# Patient Record
Sex: FEMALE | Race: AMERICAN INDIAN OR ALASKA NATIVE
[De-identification: names, ages, dates, MRNs, and addresses within clinical notes are randomized per-mention and may not be internally consistent; named-entity substitution may affect disease eponyms.]

---

## 2018-01-06 ENCOUNTER — HOSPITAL ENCOUNTER (EMERGENCY)
Dept: HOSPITAL 5 - ED | Age: 58
LOS: 1 days | Discharge: HOME | End: 2018-01-07
Payer: COMMERCIAL

## 2018-01-06 DIAGNOSIS — F41.9: ICD-10-CM

## 2018-01-06 DIAGNOSIS — Z88.8: ICD-10-CM

## 2018-01-06 DIAGNOSIS — K21.9: ICD-10-CM

## 2018-01-06 DIAGNOSIS — S60.413A: ICD-10-CM

## 2018-01-06 DIAGNOSIS — I10: ICD-10-CM

## 2018-01-06 DIAGNOSIS — E78.00: ICD-10-CM

## 2018-01-06 DIAGNOSIS — S80.01XA: Primary | ICD-10-CM

## 2018-01-06 DIAGNOSIS — M54.5: ICD-10-CM

## 2018-01-06 PROCEDURE — 72100 X-RAY EXAM L-S SPINE 2/3 VWS: CPT

## 2018-01-06 PROCEDURE — 90715 TDAP VACCINE 7 YRS/> IM: CPT

## 2018-01-06 PROCEDURE — 90471 IMMUNIZATION ADMIN: CPT

## 2018-01-06 NOTE — EMERGENCY DEPARTMENT REPORT
ED Fall HPI





- General


Chief Complaint: Fall


Stated Complaint: FALL


Time Seen by Provider: 01/06/18 23:49


Source: patient


Mode of arrival: Ambulatory





- History of Present Illness


Initial Comments: 


57-year-old female past medical history hypertension, GERD presents with 

complaint of right knee pain left middle finger abrasion and lower back pain 

status post mechanical fall.  Patient states she was shopping at [employer] 

store today and accidentally tripped over a basket that was on ground.  He fell 

forward grazed the back of her left middle finger against a metal shelf.  

States that she fell onto her right knee and then rolled onto her lower back.  

Patient states pain is currently 6 out of 10.  Denies any head or neck trauma 

denies any loss of consciousness.  This was witnessed by bystanders.  He was 

assisted by bystanders to stand up.  Adamantly denies any direct head or neck 

trauma or loss of consciousness.  Patient is awake alert and oriented 3 denies 

chest pain abdominal pain up or lower extremity paresthesias neck pain is fully 

lucid and able to provide detailed history.  Patient is ambulatory without 

assistance.  Last tetanus vaccine was over 10 years ago.  Patient states she 

called her daughter after the fall and her daughter brought her to the hospital 

for evaluation.


MD Complaint: fall


-: This afternoon


Fall From: standing


Fall Witnessed: yes, by bystander


Place Fall Occurred: other (store)


Loss of Consciousness: none


Prolonged Down Time?: no


Symptoms Prior to Fall: none


Location - Extremities: Right: Knee


Severity scale (0 -10): 7


Quality: aching


Context: tripped/slipped


Associated Symptoms: denies





- Related Data


 Home Medications











 Medication  Instructions  Recorded  Confirmed  Last Taken


 


Lisinopril [Zestril] 10 mg PO QDAY 10/18/13 06/20/16 06/19/16 23:00


 


Metoprolol [Lopressor] 25 mg PO BID 10/18/13 06/20/16 06/19/16 21:00


 


Pravastatin [Pravachol] 40 mg PO QHS 10/18/13 06/20/16 06/19/16 21:00


 


Esomeprazole Magnesium [NexIUM] 40 mg PO DAILY 06/12/16 06/20/16 06/17/16 08:00








 Previous Rx's











 Medication  Instructions  Recorded  Last Taken  Type


 


Acetaminophen [Acetaminophen TAB] 500 mg PO Q6HR PRN #30 tablet 01/07/18 

Unknown Rx


 


Acetaminophen/Codeine [Tylenol 1 tab PO Q6H PRN #6 tab 01/07/18 Unknown Rx





/Codeine # 3 tab]    


 


Neomycn/Bacitrc/Polymyx/Pramox 28 gm TP BID #1 oint...g. 01/07/18 Unknown Rx





[Triple Antibioti-Pain Rlf Oint]    











 Allergies











Allergy/AdvReac Type Severity Reaction Status Date / Time


 


iv contrast Allergy  Rash Uncoded 06/12/16 13:48














ED Review of Systems


ROS: 


Stated complaint: FALL


Other details as noted in HPI





Constitutional: denies: chills, fever


Eyes: denies: eye pain, eye discharge, vision change


ENT: denies: ear pain, throat pain


Respiratory: denies: cough, shortness of breath, wheezing


Cardiovascular: denies: chest pain, palpitations


Endocrine: no symptoms reported


Gastrointestinal: denies: abdominal pain, nausea, diarrhea


Genitourinary: denies: urgency, dysuria, discharge


Musculoskeletal: denies: back pain, joint swelling, arthralgia


Skin: denies: rash, lesions


Neurological: denies: headache, weakness, paresthesias


Psychiatric: denies: anxiety, depression


Hematological/Lymphatic: denies: easy bleeding, easy bruising





ED Past Medical Hx





- Past Medical History


Previous Medical History?: Yes


Hx Hypertension: Yes


Hx GERD: Yes (TAKES NEXIUM)


Hx Psychiatric Treatment: Yes (anxiety)


Additional medical history: high cholesterol





- Surgical History


Past Surgical History?: Yes


Additional Surgical History: hyst, tubal ligation





- Social History


Smoking Status: Never Smoker


Substance Use Type: Alcohol, Prescribed





- Medications


Home Medications: 


 Home Medications











 Medication  Instructions  Recorded  Confirmed  Last Taken  Type


 


Lisinopril [Zestril] 10 mg PO QDAY 10/18/13 06/20/16 06/19/16 23:00 History


 


Metoprolol [Lopressor] 25 mg PO BID 10/18/13 06/20/16 06/19/16 21:00 History


 


Pravastatin [Pravachol] 40 mg PO QHS 10/18/13 06/20/16 06/19/16 21:00 History


 


Esomeprazole Magnesium [NexIUM] 40 mg PO DAILY 06/12/16 06/20/16 06/17/16 08:00 

History


 


Acetaminophen [Acetaminophen TAB] 500 mg PO Q6HR PRN #30 tablet 01/07/18  

Unknown Rx


 


Acetaminophen/Codeine [Tylenol 1 tab PO Q6H PRN #6 tab 01/07/18  Unknown Rx





/Codeine # 3 tab]     


 


Neomycn/Bacitrc/Polymyx/Pramox 28 gm TP BID #1 oint...g. 01/07/18  Unknown Rx





[Triple Antibioti-Pain Rlf Oint]     














ED Physical Exam





- General


Limitations: No Limitations


General appearance: alert, in no apparent distress





- Head


Head exam: Present: atraumatic, normocephalic





- Eye


Eye exam: Present: normal appearance, PERRL, EOMI





- ENT


ENT exam: Present: mucous membranes moist





- Neck


Neck exam: Present: normal inspection, full ROM (neck flexion and extension 

fully intact)





- Respiratory


Respiratory exam: Present: normal lung sounds bilaterally.  Absent: respiratory 

distress





- Cardiovascular


Cardiovascular Exam: Present: regular rate, normal rhythm.  Absent: systolic 

murmur, diastolic murmur, rubs, gallop





- GI/Abdominal


GI/Abdominal exam: Present: soft (abdomen soft nontender nondistended 4 

quadrants), normal bowel sounds





- Extremities Exam


Extremities exam: Present: normal inspection





- Expanded Upper Extremity Exam


  ** Left


Upper Arm exam: Present: normal inspection, full ROM


Elbow exam: Present: normal inspection, full ROM


Forearm Wrist exam: Present: normal inspection, full ROM


Hand Wrist exam: Present: normal inspection, full ROM (range of motion DIPs 

MCPs and PIPs left hand fully intact)


Neuro motor exam: Present: wrist extension intact, thumb opposition intact, 

thumb IP flexion intact, thumb adduction intact, fingers 2-5 abduction intact


Neurosensory exam: Present: radial nerve intact, ulnar nerve intact, median 

nerve intact


Vascular: Present: normal capillary refill (capillary refill less than one 

second in all fingers left hand), radial pulse (distal radial brachial and 

ulnar pulses intact on palpation), brachial pulse, ulnar pulse





- Expanded Lower Extremity Exam


  ** Right


Upper Leg exam: Present: normal inspection, full ROM


Knee exam: Present: normal inspection, full ROM (right knee flexion and 

extension fully intact on exam)


Lower Leg exam: Present: normal inspection, full ROM


Ankle exam: Present: normal inspection, full ROM


Foot/Toe exam: Present: normal inspection, full ROM


Neuro vascular tendon exam: Present: no vascular compromise (distal capillary 

refill right foot intact, dorsalis pedis and posterior tibial pulses intact)





- Back Exam


Back exam: Present: normal inspection





- Neurological Exam


Neurological exam: Present: alert, oriented X3, CN II-XII intact





- Expanded Neurological Exam


  ** Expanded


Patient oriented to: Present: person, place, time


Cranial nerves: EOM's Intact: Normal, Facial Sensation: Normal


Cerebellar function: Finger to Nose: Normal, Heel to Shin: Normal, Romberg: 

Normal


Sensory exam: Upper Extremity Light Touch: Abnormal Right, Lower Extremity 

Light Touch: Abnormal Right


Motor strength exam: RUE: 5, LUE: 5, RLE: 5, LLE: 5


Best Eye Response ([place]): (4) open spontaneously


Best Motor Response ([place]): (6) obeys commands


Best Verbal Response ([place]): (5) oriented


[place] Total: 15





- Psychiatric


Psychiatric exam: Present: normal affect, normal mood





- Skin


Skin exam: Present: warm, dry, intact, normal color.  Absent: rash





ED Course


 Vital Signs











  01/06/18 01/07/18 01/07/18





  17:58 00:03 00:11


 


Temperature 98.1 F  


 


Pulse Rate 62  56 L


 


Respiratory 18  18





Rate   


 


Blood Pressure 141/69 140/76 


 


O2 Sat by Pulse 100  100





Oximetry   














ED Medical Decision Making





- Medical Decision Making


A/P: Musculoskeletal pain, right knee contusion, left finger abrasion


1-triple antibiotic ointment, abrasion is superficial no need for sutures, 

tetanus update today


2-short course Tylenol 3 when necessary.  Ace wrap right knee, RICE therapy, 

patient is ambulatory


3-x-rays show no acute fractures, some degenerative changes and L-spine chronic


4-all primary care doctor 


Critical care attestation.: 


If time is entered above; I have spent that time in minutes in the direct care 

of this critically ill patient, excluding procedure time.








ED Disposition


Clinical Impression: 


Abrasion of finger of left hand


Qualifiers:


 Encounter type: initial encounter Qualified Code(s): S60.419A - Abrasion of 

unspecified finger, initial encounter





Contusion of right knee


Qualifiers:


 Encounter type: initial encounter Qualified Code(s): S80.01XA - Contusion of 

right knee, initial encounter





Lower back pain


Qualifiers:


 Chronicity: acute Back pain laterality: bilateral Sciatica presence: without 

sciatica Qualified Code(s): M54.5 - Low back pain





Fall on same level from slipping, tripping or stumbling


Qualifiers:


 Encounter type: initial encounter Qualified Code(s): W01.0XXA - Fall on same 

level from slipping, tripping and stumbling without subsequent striking against 

object, initial encounter





Disposition: DC-01 TO HOME OR SELFCARE


Is pt being admited?: No


Does the pt Need Aspirin: No


Condition: Stable


Instructions:  Abrasion (ED), Knee Pain (ED), Contusion in Adults (ED), Back 

Pain (ED)


Prescriptions: 


Acetaminophen [Acetaminophen TAB] 500 mg PO Q6HR PRN #30 tablet


 PRN Reason: Pain


Acetaminophen/Codeine [Tylenol /Codeine # 3 tab] 1 tab PO Q6H PRN #6 tab


 PRN Reason: Pain


Neomycn/Bacitrc/Polymyx/Pramox [Triple Antibioti-Pain Rlf Oint] 28 gm TP BID #1 

oint...g.


Referrals: 


[facility] [Other] - 3-5 Days


Forms:  Work/School Release Form(ED)


Time of Disposition: 01:07

## 2018-01-07 VITALS — SYSTOLIC BLOOD PRESSURE: 140 MMHG | DIASTOLIC BLOOD PRESSURE: 76 MMHG

## 2018-01-07 NOTE — XRAY REPORT
FINAL REPORT



EXAM:  XR SPINE LUMBOSACRAL 2-3V



HISTORY:  s/p fall lower back pain 



TECHNIQUE:  Three views of the lumbar spine were obtained.



FINDINGS:  

There is a grade 1 anterolisthesis of L3 over L4 with very mild

narrowing of the disc. There is endplate spurring at this level.

The remaining disc heights are well maintained. There is no

evidence of acute fracture. The SI joints appear normal. There

are multiple phleboliths along the floor pelvis.



IMPRESSION:  

Grade 1 anterolisthesis of L3 over L4.



No evidence of acute fracture.

## 2018-01-07 NOTE — XRAY REPORT
FINAL REPORT



EXAM:  XR KNEE 3V RT



HISTORY:  s/p fall right knee pain 



TECHNIQUE:  Three views of the right knee were submitted.



FINDINGS:  

There is no evidence of fracture, joint effusion, or soft tissue

swelling. 3 compartments are fairly well maintained. There

localize calcification abutting the medial femoral condyle

possibly related to remote ligamentous injury.



IMPRESSION:  

No evidence of acute injury.

## 2019-01-10 ENCOUNTER — HOSPITAL ENCOUNTER (INPATIENT)
Dept: HOSPITAL 5 - ED | Age: 59
LOS: 1 days | Discharge: HOME | DRG: 392 | End: 2019-01-11
Attending: INTERNAL MEDICINE | Admitting: INTERNAL MEDICINE
Payer: COMMERCIAL

## 2019-01-10 DIAGNOSIS — I25.10: ICD-10-CM

## 2019-01-10 DIAGNOSIS — F41.9: ICD-10-CM

## 2019-01-10 DIAGNOSIS — K21.9: Primary | ICD-10-CM

## 2019-01-10 DIAGNOSIS — Z90.710: ICD-10-CM

## 2019-01-10 DIAGNOSIS — Z91.041: ICD-10-CM

## 2019-01-10 DIAGNOSIS — I10: ICD-10-CM

## 2019-01-10 DIAGNOSIS — R00.1: ICD-10-CM

## 2019-01-10 DIAGNOSIS — R00.2: ICD-10-CM

## 2019-01-10 DIAGNOSIS — E78.00: ICD-10-CM

## 2019-01-10 DIAGNOSIS — Z60.2: ICD-10-CM

## 2019-01-10 DIAGNOSIS — Z98.51: ICD-10-CM

## 2019-01-10 DIAGNOSIS — E80.6: ICD-10-CM

## 2019-01-10 PROCEDURE — 36415 COLL VENOUS BLD VENIPUNCTURE: CPT

## 2019-01-10 PROCEDURE — 93005 ELECTROCARDIOGRAM TRACING: CPT

## 2019-01-10 PROCEDURE — 85025 COMPLETE CBC W/AUTO DIFF WBC: CPT

## 2019-01-10 PROCEDURE — 93010 ELECTROCARDIOGRAM REPORT: CPT

## 2019-01-10 PROCEDURE — 93017 CV STRESS TEST TRACING ONLY: CPT

## 2019-01-10 PROCEDURE — 70450 CT HEAD/BRAIN W/O DYE: CPT

## 2019-01-10 PROCEDURE — 82550 ASSAY OF CK (CPK): CPT

## 2019-01-10 PROCEDURE — A9502 TC99M TETROFOSMIN: HCPCS

## 2019-01-10 PROCEDURE — 84484 ASSAY OF TROPONIN QUANT: CPT

## 2019-01-10 PROCEDURE — 80053 COMPREHEN METABOLIC PANEL: CPT

## 2019-01-10 PROCEDURE — 74176 CT ABD & PELVIS W/O CONTRAST: CPT

## 2019-01-10 PROCEDURE — 78452 HT MUSCLE IMAGE SPECT MULT: CPT

## 2019-01-10 PROCEDURE — 82553 CREATINE MB FRACTION: CPT

## 2019-01-10 PROCEDURE — 81001 URINALYSIS AUTO W/SCOPE: CPT

## 2019-01-10 SDOH — SOCIAL STABILITY - SOCIAL INSECURITY: PROBLEMS RELATED TO LIVING ALONE: Z60.2

## 2019-01-11 VITALS — DIASTOLIC BLOOD PRESSURE: 71 MMHG | SYSTOLIC BLOOD PRESSURE: 136 MMHG

## 2019-01-11 LAB
ALBUMIN SERPL-MCNC: 4.4 G/DL (ref 3.9–5)
ALT SERPL-CCNC: 19 UNITS/L (ref 7–56)
BASOPHILS # (AUTO): 0 K/MM3 (ref 0–0.1)
BASOPHILS NFR BLD AUTO: 0.6 % (ref 0–1.8)
BILIRUB UR QL STRIP: (no result)
BLOOD UR QL VISUAL: (no result)
BUN SERPL-MCNC: 16 MG/DL (ref 7–17)
BUN/CREAT SERPL: 20 %
CALCIUM SERPL-MCNC: 9.4 MG/DL (ref 8.4–10.2)
EOSINOPHIL # BLD AUTO: 0.1 K/MM3 (ref 0–0.4)
EOSINOPHIL NFR BLD AUTO: 1 % (ref 0–4.3)
HCT VFR BLD CALC: 36.4 % (ref 30.3–42.9)
HEMOLYSIS INDEX: 7
HGB BLD-MCNC: 12.3 GM/DL (ref 10.1–14.3)
LYMPHOCYTES # BLD AUTO: 2.5 K/MM3 (ref 1.2–5.4)
LYMPHOCYTES NFR BLD AUTO: 45.1 % (ref 13.4–35)
MCHC RBC AUTO-ENTMCNC: 34 % (ref 30–34)
MCV RBC AUTO: 83 FL (ref 79–97)
MONOCYTES # (AUTO): 0.5 K/MM3 (ref 0–0.8)
MONOCYTES % (AUTO): 9.7 % (ref 0–7.3)
PH UR STRIP: 6 [PH] (ref 5–7)
PLATELET # BLD: 288 K/MM3 (ref 140–440)
PROT UR STRIP-MCNC: (no result) MG/DL
RBC # BLD AUTO: 4.4 M/MM3 (ref 3.65–5.03)
RBC #/AREA URNS HPF: 6 /HPF (ref 0–6)
UROBILINOGEN UR-MCNC: < 2 MG/DL (ref ?–2)
WBC #/AREA URNS HPF: < 1 /HPF (ref 0–6)

## 2019-01-11 RX ADMIN — LISINOPRIL SCH MG: 5 TABLET ORAL at 10:42

## 2019-01-11 RX ADMIN — LISINOPRIL SCH: 5 TABLET ORAL at 10:44

## 2019-01-11 NOTE — DISCHARGE SUMMARY
Providers





- Providers


Date of Admission: 


01/11/19 05:40





Date of discharge: 01/11/19


Attending physician: 


RON DAWSON





                                        





01/11/19 06:05


Consult to Physician [CONS] Routine 


   Comment: 


   Consulting Provider: SHMUEL BROWER


   Physician Instructions: 


   Reason For Exam: Bradycardia, Pt's known to you











Primary care physician: 


PRIMARY CARE MD








Hospitalization


Reason for admission: cp, dizziness


Condition: Critical


Hospital course: 





Pt is a 58 year old BF with PMHx of CAD, elevated heart rate, GERD, HTN who 

presents to the ER with c/o dizziness, started this morning, pt states that the 

dizziness continue throughout the day, she checked her heart rate in the evening

 which was "46", she decided to come to the ER for evaluation. Pt also c/o chest

 pain, with started in the afternoon with the dizziness,  pt states that it is a

 mild, dull pain, located in the left subternal area, with no radiation. Pt 

reports palpitation and abdominal discomfort, she c/o nausea due to dizziness, 

and worsen dizziness with activity. Pt reports a similar chest pain, last stress

 test was about 3 year ago, she denies prior stent placement. She notes that she

 f/u with cardiology for her cardiac issues and for tachycardia for which she is

 taking beta blocker, her beta blocker was recently changed from lopressor to a 

once a day regimen which she cannot recall the name. Pt denies any acute 

illness, she denies SOB. LOC, denies headache, denies visual changes, she is 

being admitted for further evaluation and treatment of her presenting symptoms. 

 The patient was evaluated by cardiology in consultation who felt that chest 

pain was atypical.  EKG revealed nonspecific T-wave abnormalities.  The patient 

underwent MPI which showed no evidence of ischemia and a normal LVEF.  

Cardiology felt that no further workup was needed.  Patient is to discharge home

 and to discontinue metoprolol due to underlying sinus bradycardia.  Etiology of

 CP likely GERD.  Dedicated discharge time 32 minutes.


Disposition: DC-01 TO HOME OR SELFCARE


Time spent for discharge: 32





- Discharge Diagnoses


(1) Bradycardia


Status: Acute   





(2) Chest pain


Status: Acute   


Qualifiers: 


   Chest pain type: unspecified   Qualified Code(s): R07.9 - Chest pain, 

unspecified   





(3) Dizziness


Status: Acute   





(4) Heart palpitations


Status: Acute   





(5) Symptomatic bradycardia


Status: Acute   





Core Measure Documentation





- Palliative Care


Palliative Care/ Comfort Measures: Not Applicable





- Core Measures


Any of the following diagnoses?: none





Exam





- Constitutional


Vitals: 


                                        











Temp Pulse Resp BP Pulse Ox


 


 98.1 F   92 H  18   136/71   100 


 


 01/11/19 08:50  01/11/19 10:44  01/11/19 10:00  01/11/19 10:44  01/11/19 10:00











General appearance: Present: no acute distress, well-nourished





- EENT


Eyes: Present: PERRL


ENT: hearing intact, clear oral mucosa





- Neck


Neck: Present: supple, normal ROM





- Respiratory


Respiratory effort: normal


Respiratory: bilateral: CTA





- Cardiovascular


Heart Sounds: Present: S1 & S2.  Absent: rub, click





- Extremities


Extremities: pulses symmetrical, No edema


Peripheral Pulses: within normal limits





- Abdominal


General gastrointestinal: Present: soft, non-tender, non-distended, normal bowel

 sounds


Female genitourinary: Present: normal





- Integumentary


Integumentary: Present: clear, warm, dry





- Musculoskeletal


Musculoskeletal: gait normal, strength equal bilaterally





- Psychiatric


Psychiatric: appropriate mood/affect, intact judgment & insight





- Neurologic


Neurologic: CNII-XII intact, moves all extremities





Plan


Activity: no restrictions


Weight Bearing Status: Full Weight Bearing


Diet: regular


Additional Instructions: D/C home med metoprolol


Follow up with: 


PRIMARY CARE,MD [Primary Care Provider] - 7 Days

## 2019-01-11 NOTE — CAT SCAN REPORT
FINAL REPORT



PROCEDURE:  CT ABDOMEN PELVIS WO CON



TECHNIQUE:  Computerized axial tomography of the abdomen and pelvis was performed without intravenous
 contrast. This study is performed without intravascular contrast material and its sensitivity for ab
dominal and pelvic pathology, including neoplasms, inflammation, abscess, free fluid, thrombosis, art
erial dissection and infarction, is reduced compared with a contrast enhanced study. 



HISTORY:  lower abd pain 



COMPARISON:  No prior studies are available for comparison.



FINDINGS:  

Visualized lower thorax: No significant abnormality.



Liver: Normal size and attenuation. There are hypodense lesions in the liver suggesting cysts. 



Spleen: Normal size and attenuation.



Gallbladder and biliary system: Normal.



Pancreas: Normal.



Adrenals: Normal.



Kidneys: There are no kidney stones. There is no hydronephrosis..



GI tract: There is no bowel obstruction, colitis or enteritis. The appendix is normal.



Lymph nodes and mesentery: Normal.



Vasculature: Normal.



Bladder: Normal.



Reproductive organs: There has been a hysterectomy..



Peritoneum: There is no ascites or free air, abscess or adenopathy..



Musculoskeletal structures: No significant abnormality.



Other: None.



IMPRESSION:  

There are hypodense lesions in the liver suggesting cysts. 



There are no kidney stones. There is no hydronephrosis..



There is no bowel obstruction, colitis or enteritis. The appendix is normal.



There has been a hysterectomy..



There is no ascites or free air, abscess or adenopathy..



.

## 2019-01-11 NOTE — EMERGENCY DEPARTMENT REPORT
ED Palpitations HPI





- General


Chief Complaint: Arrhythmia/Palpitations


Stated Complaint: DIZZINESS/HEART PALPITATIONS


Time Seen by Provider: 01/11/19 01:20


Source: patient


Mode of arrival: Ambulatory


Limitations: No Limitations





- History of Present Illness


Initial Comments: 





Patient is 58-year-old female presents emergency room for multiple complaints.  

Patient's first complaint is dizziness and heart palpitations been going on for 

12 hours.  Patient also complains of abdominal pain that is improving.  Patient 

states the abdominal pain is a 5 out of 10.  Patient states abdominal pain is in

her lower abdomen and nonradiating.  Patient states the pain is better for rest 

and worse with palpation and exertion.  Patient states her dizziness is worse w

ith exertion and better with rest.  Patient is also complaining of chest pain 

that started one day ago.  Patient states chest pain is improving in a 3 out of 

10.  Patient states that she is having heart palpitations and feels like her 

heart is beating very slow.  Patient states the chest pain is better with rest 

and worse with exertion.  Patient states she was seen in the urgent care today 

for abdominal pain and given Levsin which has improved her dominant pain.  

Denies shortness of breath.  Patient denies headache.  Patient denies blurry 

vision.  Patient denies fever and chills.


MD Complaint: palpitations, irregular heart beat





- Related Data


                                Home Medications











 Medication  Instructions  Recorded  Confirmed  Last Taken


 


Lisinopril [Zestril] 10 mg PO QDAY 10/18/13 06/20/16 06/19/16 23:00


 


Metoprolol [Lopressor] 25 mg PO BID 10/18/13 06/20/16 06/19/16 21:00


 


Pravastatin [Pravachol] 40 mg PO QHS 10/18/13 06/20/16 06/19/16 21:00


 


Esomeprazole Magnesium [NexIUM] 40 mg PO DAILY 06/12/16 06/20/16 06/17/16 08:00








                                  Previous Rx's











 Medication  Instructions  Recorded  Last Taken  Type


 


Acetaminophen [Acetaminophen TAB] 500 mg PO Q6HR PRN #30 tablet 01/07/18 Unknown

Rx


 


Acetaminophen/Codeine [Tylenol 1 tab PO Q6H PRN #6 tab 01/07/18 Unknown Rx





/Codeine # 3 tab]    


 


Neomycn/Bacitrc/Polymyx/Pramox 28 gm TP BID #1 oint...g. 01/07/18 Unknown Rx





[Triple Antibioti-Pain Rlf Oint]    











                                    Allergies











Allergy/AdvReac Type Severity Reaction Status Date / Time


 


iv contrast Allergy  Rash Uncoded 06/12/16 13:48














ED Review of Systems


ROS: 


Stated complaint: DIZZINESS/HEART PALPITATIONS


Other details as noted in HPI





Constitutional: denies: chills, fever


Eyes: denies: eye pain, eye discharge, vision change


ENT: denies: ear pain, throat pain


Respiratory: denies: cough, shortness of breath, wheezing


Cardiovascular: chest pain, palpitations


Endocrine: no symptoms reported


Gastrointestinal: abdominal pain.  denies: nausea, diarrhea


Genitourinary: denies: urgency, dysuria, discharge


Musculoskeletal: denies: back pain, joint swelling, arthralgia


Skin: denies: rash, lesions


Neurological: vertigo.  denies: headache, weakness, paresthesias


Psychiatric: denies: anxiety, depression


Hematological/Lymphatic: denies: easy bleeding, easy bruising





ED Past Medical Hx





- Past Medical History


Previous Medical History?: Yes


Hx Hypertension: Yes


Hx GERD: Yes (TAKES NEXIUM)


Hx Psychiatric Treatment: Yes (anxiety)


Additional medical history: high cholesterol





- Surgical History


Past Surgical History?: Yes


Additional Surgical History: hyst 2005, tubal ligation





- Family History


Family history: no significant





- Social History


Smoking Status: Never Smoker


Substance Use Type: None





- Medications


Home Medications: 


                                Home Medications











 Medication  Instructions  Recorded  Confirmed  Last Taken  Type


 


Lisinopril [Zestril] 10 mg PO QDAY 10/18/13 06/20/16 06/19/16 23:00 History


 


Metoprolol [Lopressor] 25 mg PO BID 10/18/13 06/20/16 06/19/16 21:00 History


 


Pravastatin [Pravachol] 40 mg PO QHS 10/18/13 06/20/16 06/19/16 21:00 History


 


Esomeprazole Magnesium [NexIUM] 40 mg PO DAILY 06/12/16 06/20/16 06/17/16 08:00 

History


 


Acetaminophen [Acetaminophen TAB] 500 mg PO Q6HR PRN #30 tablet 01/07/18  

Unknown Rx


 


Acetaminophen/Codeine [Tylenol 1 tab PO Q6H PRN #6 tab 01/07/18  Unknown Rx





/Codeine # 3 tab]     


 


Neomycn/Bacitrc/Polymyx/Pramox 28 gm TP BID #1 oint...g. 01/07/18  Unknown Rx





[Triple Antibioti-Pain Rlf Oint]     














ED Physical Exam





- General


Limitations: No Limitations


General appearance: alert, in no apparent distress





- Head


Head exam: Present: atraumatic, normocephalic





- Eye


Eye exam: Present: normal appearance





- ENT


ENT exam: Present: mucous membranes moist





- Neck


Neck exam: Present: normal inspection





- Respiratory


Respiratory exam: Present: normal lung sounds bilaterally.  Absent: respiratory 

distress





- Cardiovascular


Cardiovascular Exam: Present: regular rate, normal rhythm.  Absent: systolic 

murmur, diastolic murmur, rubs, gallop





- GI/Abdominal


GI/Abdominal exam: Present: soft, tenderness (bilateral lower quadrant 

tenderness), normal bowel sounds





- Extremities Exam


Extremities exam: Present: normal inspection





- Back Exam


Back exam: Present: normal inspection





- Neurological Exam


Neurological exam: Present: alert, oriented X3





- Psychiatric


Psychiatric exam: Present: normal affect, normal mood





- Skin


Skin exam: Present: warm, dry, intact, normal color.  Absent: rash





ED Course


                                   Vital Signs











  01/10/19 01/10/19 01/11/19





  22:55 23:45 01:19


 


Temperature 97.8 F 97.8 F 


 


Pulse Rate 51 L 46 L 46 L


 


Respiratory 16 18 15





Rate   


 


Blood Pressure 188/65 188/65 


 


O2 Sat by Pulse 100 100 100





Oximetry   














  01/11/19 01/11/19 01/11/19





  01:30 02:54 03:00


 


Temperature   


 


Pulse Rate 48 L  56 L


 


Respiratory 13  17





Rate   


 


Blood Pressure 132/70 132/70 143/71


 


O2 Sat by Pulse 100 98 99





Oximetry   














  01/11/19 01/11/19 01/11/19





  03:30 04:00 04:30


 


Temperature   


 


Pulse Rate 54 L 53 L 55 L


 


Respiratory 14 16 14





Rate   


 


Blood Pressure 132/70 126/67 126/67


 


O2 Sat by Pulse 99 98 99





Oximetry   














  01/11/19





  05:00


 


Temperature 


 


Pulse Rate 53 L


 


Respiratory 14





Rate 


 


Blood Pressure 126/67


 


O2 Sat by Pulse 98





Oximetry 














- Reevaluation(s)


Reevaluation #1: 


Discussed all results with patient.  Patient agrees to plan of care and adm

ission.


01/11/19 04:08








- Consultations


Consultation #1: 


Hospitalist consult for admission.  Hospitalist to admit patient and assume care

of patient.


01/11/19 04:08











ED Medical Decision Making





- Lab Data


Result diagrams: 


                                 01/11/19 00:03





                                 01/11/19 00:03





- EKG Data


-: EKG Interpreted by Me


EKG shows normal: sinus rhythm, axis, intervals, QRS complexes, ST-T waves


Rate: bradycardia





- Radiology Data


Radiology results: report reviewed





FINAL REPORT 





 PROCEDURE: CT HEAD/BRAIN WO CON 





 TECHNIQUE: Computerized tomography of the head was performed without contrast 

material. 





 HISTORY: dizzy 





 COMPARISON: No prior studies are available for comparison. 





 FINDINGS: 


 Skull and scalp: Normal. 





 Paranasal sinuses: Normal. 





 Ventricles and subarachnoid spaces: Normal. 





 Cerebrum: No evidence of hemorrhage, acute infarction or mass . 





 Cerebellum and brainstem: No evidence of hemorrhage, acute infarction or mass. 





 Vasculature: Normal. 





 Comments: None. 





 IMPRESSION: 


 Normal Examination 























 FINAL REPORT 





 PROCEDURE: CT ABDOMEN PELVIS WO CON 





 TECHNIQUE: Computerized axial tomography of the abdomen and pelvis was 

performed without 


 intravenous contrast. This study is performed without intravascular contrast 

material and its 


 sensitivity for abdominal and pelvic pathology, including neoplasms, 

inflammation, abscess, free 


 fluid, thrombosis, arterial dissection and infarction, is reduced compared with

a contrast enhanced 


 study. 





 HISTORY: lower abd pain 





 COMPARISON: No prior studies are available for comparison. 





 FINDINGS: 


 Visualized lower thorax: No significant abnormality. 





 Liver: Normal size and attenuation. There are hypodense lesions in the liver 

suggesting cysts. 





 Spleen: Normal size and attenuation. 





 Gallbladder and biliary system: Normal. 





 Pancreas: Normal. 





 Adrenals: Normal. 





 Kidneys: There are no kidney stones. There is no hydronephrosis.. 





 GI tract: There is no bowel obstruction, colitis or enteritis. The appendix is 

normal. 





 Lymph nodes and mesentery: Normal. 





 Vasculature: Normal. 





 Bladder: Normal. 





 Reproductive organs: There has been a hysterectomy.. 





 Peritoneum: There is no ascites or free air, abscess or adenopathy.. 





 Musculoskeletal structures: No significant abnormality. 





 Other: None. 





 IMPRESSION: 


 There are hypodense lesions in the liver suggesting cysts. 





 There are no kidney stones. There is no hydronephrosis.. 





 There is no bowel obstruction, colitis or enteritis. The appendix is normal. 





 There has been a hysterectomy.. 





 There is no ascites or free air, abscess or adenopathy.. 





 . 











 Transcribed By: CO 


 Dictated By: COBY WALL MD 


 Electronically Authenticated By: COBY WALL MD 


 Signed Date/Time: 01/11/19 0240 











- Medical Decision Making





Patient's 58-year-old woman presents for multiple complaints.  Patient DENIED 

bradycardia.  Patient's is cardiac related.  Labs are negative.  CT head 

negative.  CT abdomen negative.  CT of the head was done for her dizziness.  CT 

abdomen was done for abdominal pain.





- Differential Diagnosis


abdominal pain.  Chest pain.  Bradycardia.  Palpitations.


Critical Care Time: Yes


Critical care attestation.: 


If time is entered above; I have spent that time in minutes in the direct care 

of this critically ill patient, excluding procedure time.





Critical Care Time: 


55 minutes








ED Disposition


Clinical Impression: 


 Dizziness, Bradycardia, Heart palpitations, Symptomatic bradycardia





Chest pain


Qualifiers:


 Chest pain type: unspecified Qualified Code(s): R07.9 - Chest pain, unspecified





Abdominal pain


Qualifiers:


 Abdominal location: lower abdomen, unspecified Qualified Code(s): R10.30 - 

Lower abdominal pain, unspecified





Disposition: DC-09 OP ADMIT IP TO THIS HOSP


Is pt being admited?: Yes


Does the pt Need Aspirin: No


Condition: Critical


Time of Disposition: 04:07

## 2019-01-11 NOTE — TREADMILL REPORT
STRESS TEST





ORDERING PHYSICIAN:  Pj Angel MD





INDICATION:  Dizziness, palpitation and chest pain.





FINDINGS:  There is no scintigraphic evidence of myocardial ischemia.  The left

ventricle is normal in size.  The left ventricular ejection fraction is measured

at 65%.  There is normal wall motion and wall thickening.





CONCLUSION:  No perfusion scan.





DD: 01/11/2019 10:15

DT: 01/11/2019 13:02

JOB# [id number]  [id number]

GILBERTO/ROCIO

## 2019-01-11 NOTE — EVENT NOTE
Date: 01/11/19





Atypical chest pain


   ECG showing SB with non-specific T wave abnormalities


   MPI - no evidence of ischemia, normal LVEF


Dizziness


Palpitations





No further inpatient cardiac work-up is needed


Discontinue and do not resume metoprolol on discharge due to underlying sinus 

bradycardia


May go home with outpatient follow-up

## 2019-01-11 NOTE — QUERY- CHEST PAIN
Veronica Mitchell________________  Date:___1/11/2019______________   



/CDS:___Nava_____________ Phone#:___[phone]____________



Exercise your independent professional judgment when responding to query.  

Questions asked do not imply a particular answer is desired or expected. We 
greatly appreciate your clarification on this issue.           

Clinical Documentation States:



Pt is a 58 year old BF with PMHx of CAD, elevated heart rate, GERD, HTN who 
presents to the ER with c/o dizziness, started this morning. Pt also c/o chest 
pain, with started in the afternoon with the dizziness,  pt states that it is a 
mild, dull pain, located in the left substernal area, with no radiation.





- Discharge Diagnoses

       (2) Chest pain

Status: Acute   

Qualifiers: 

   Chest pain type: unspecified   Qualified Code(s): R07.9 - Chest pain, 
unspecified   





Please document the etiology of Chest Pain:

[ ]  Myocardial Infarction

[ ]  Pneumonia

[ ]  Mediastinitis

[ ]  Costochondritis

[ ]  Pulmonary  Embolism

[ ]  Coronary  Artery  Disease

[ x]  GERD

[ ]  Other:__________    

[ ]  Comment/Explanation:____________







Present on Admission: [ x] Yes (Y)      [ ] Clinically undeterminable (W)     [ 
] No(N)  



Please  document response in your Progress Notes and/or Discharge Summary and 
indicate 

if the condition was present on admission.

PETROS

## 2019-01-11 NOTE — CAT SCAN REPORT
FINAL REPORT



PROCEDURE:  CT HEAD/BRAIN WO CON



TECHNIQUE:  Computerized tomography of the head was performed without contrast material. 



HISTORY:  dizzy 



COMPARISON:  No prior studies are available for comparison.



FINDINGS:  

Skull and scalp: Normal.



Paranasal sinuses: Normal.



Ventricles and subarachnoid spaces: Normal.



Cerebrum: No evidence of hemorrhage, acute infarction or mass .



Cerebellum and brainstem: No evidence of hemorrhage, acute infarction or mass.



Vasculature: Normal.



Comments: None.



IMPRESSION:  

Normal Examination

## 2019-01-11 NOTE — HISTORY AND PHYSICAL REPORT
<SAINT-RAYA,HURLINE - Last Filed: 01/11/19 06:32>





History of Present Illness


Date of examination: 01/11/19


Date of admission: 


01/11/2019


Chief complaint: 





Low heart rate, dizziness


History of present illness: 





Pt is a 58 year old BF with PMHx of CAD, elevated heart rate, GERD, HTN who 

presents to the ER with c/o dizziness, started this morning, pt states that the 

dizziness continue throughout the day, she checked her heart rate in the evening

which was "46", she decided to come to the ER for evaluation. Pt also c/o chest 

pain, with started in the afternoon with the dizziness,  pt states that it is a 

mild, dull pain, located in the left subternal area, with no radiation. Pt 

reports palpitation and abdominal discomfort, she c/o nausea due to dizziness, 

and worsen dizziness with activity. Pt reports a similar chest pain, last stress

test was about 3 year ago, she denies prior stent placement. She notes that she 

f/u with cardiology for her cardiac issues and for tachycardia for which she is 

taking beta blocker, her beta blocker was recently changed from lopressor to a 

once a day regimen which she cannot recall the name. Pt denies any acute 

illness, she denies SOB. LOC, denies headache, denies visual changes, she is 

being admitted for further evaluation and treatment of her presenting symptoms. 

 





Past History


Past Medical History: CAD, hypertension, hyperlipidemia


Past Surgical History: No surgical history


Social history: no significant social history, Lives alone


Family history: no significant family history





Medications and Allergies


                                    Allergies











Allergy/AdvReac Type Severity Reaction Status Date / Time


 


iv contrast Allergy  Rash Uncoded 06/12/16 13:48











                                Home Medications











 Medication  Instructions  Recorded  Confirmed  Last Taken  Type


 


Lisinopril [Zestril] 10 mg PO QDAY 10/18/13 06/20/16 06/19/16 23:00 History


 


Metoprolol [Lopressor] 25 mg PO BID 10/18/13 06/20/16 06/19/16 21:00 History


 


Pravastatin [Pravachol] 40 mg PO QHS 10/18/13 06/20/16 06/19/16 21:00 History


 


Esomeprazole Magnesium [NexIUM] 40 mg PO DAILY 06/12/16 06/20/16 06/17/16 08:00 

History


 


Acetaminophen [Acetaminophen TAB] 500 mg PO Q6HR PRN #30 tablet 01/07/18  

Unknown Rx


 


Acetaminophen/Codeine [Tylenol 1 tab PO Q6H PRN #6 tab 01/07/18  Unknown Rx





/Codeine # 3 tab]     


 


Neomycn/Bacitrc/Polymyx/Pramox 28 gm TP BID #1 oint...g. 01/07/18  Unknown Rx





[Triple Antibioti-Pain Rlf Oint]     











Active Meds: 


Active Medications





Acetaminophen (Tylenol)  650 mg PO Q4H PRN


   PRN Reason: Pain MILD(1-3)/Fever >100.5/HA


Metoclopramide HCl (Reglan)  10 mg IV Q6H PRN


   PRN Reason: Nausea And Vomiting


Ondansetron HCl (Zofran)  4 mg IV Q8H PRN


   PRN Reason: Nausea And Vomiting


Sodium Chloride (Sodium Chloride Flush Syringe 10 Ml)  10 ml IV BID GLROIA


Sodium Chloride (Sodium Chloride Flush Syringe 10 Ml)  10 ml IV PRN PRN


   PRN Reason: LINE FLUSH











Review of Systems


Cardiovascular: chest pain, palpitations





Exam





- Constitutional


Vitals: 


                                        











Temp Pulse Resp BP Pulse Ox


 


 97.8 F   53 L  14   126/67   98 


 


 01/10/19 23:45  01/11/19 05:00  01/11/19 05:00  01/11/19 05:00  01/11/19 05:00











General appearance: Present: no acute distress





- EENT


Eyes: Present: EOM intact


ENT: hearing intact





- Neck


Neck: Present: normal ROM





- Respiratory


Respiratory effort: normal


Respiratory: bilateral: CTA





- Cardiovascular


Heart rate: 57


Rhythm: regular


Heart Sounds: Present: S1 & S2





- Extremities


Extremities: no ischemia


Peripheral Pulses: within normal limits





- Abdominal


General gastrointestinal: Present: non-tender, non-distended


Female genitourinary: Present: deferred





- Rectal


Rectal Exam: deferred





- Integumentary


Integumentary: Present: warm, dry





- Musculoskeletal


Musculoskeletal: strength equal bilaterally





- Psychiatric


Psychiatric: appropriate mood/affect, cooperative





- Neurologic


Neurologic: moves all extremities





Results





- Labs


CBC & Chem 7: 


                                 01/11/19 00:03





                                 01/11/19 00:03


Labs: 


                             Laboratory Last Values











WBC  5.6 K/mm3 (4.5-11.0)   01/11/19  00:03    


 


RBC  4.40 M/mm3 (3.65-5.03)   01/11/19  00:03    


 


Hgb  12.3 gm/dl (10.1-14.3)   01/11/19  00:03    


 


Hct  36.4 % (30.3-42.9)   01/11/19  00:03    


 


MCV  83 fl (79-97)   01/11/19  00:03    


 


MCH  28 pg (28-32)   01/11/19  00:03    


 


MCHC  34 % (30-34)   01/11/19  00:03    


 


RDW  14.0 % (13.2-15.2)   01/11/19  00:03    


 


Plt Count  288 K/mm3 (140-440)   01/11/19  00:03    


 


Lymph % (Auto)  45.1 % (13.4-35.0)  H  01/11/19  00:03    


 


Mono % (Auto)  9.7 % (0.0-7.3)  H  01/11/19  00:03    


 


Eos % (Auto)  1.0 % (0.0-4.3)   01/11/19  00:03    


 


Baso % (Auto)  0.6 % (0.0-1.8)   01/11/19  00:03    


 


Lymph #  2.5 K/mm3 (1.2-5.4)   01/11/19  00:03    


 


Mono #  0.5 K/mm3 (0.0-0.8)   01/11/19  00:03    


 


Eos #  0.1 K/mm3 (0.0-0.4)   01/11/19  00:03    


 


Baso #  0.0 K/mm3 (0.0-0.1)   01/11/19  00:03    


 


Seg Neutrophils %  43.6 % (40.0-70.0)   01/11/19  00:03    


 


Seg Neutrophils #  2.4 K/mm3 (1.8-7.7)   01/11/19  00:03    


 


Sodium  141 mmol/L (137-145)   01/11/19  00:03    


 


Potassium  4.1 mmol/L (3.6-5.0)   01/11/19  00:03    


 


Chloride  100.9 mmol/L ()   01/11/19  00:03    


 


Carbon Dioxide  27 mmol/L (22-30)   01/11/19  00:03    


 


Anion Gap  17 mmol/L  01/11/19  00:03    


 


BUN  16 mg/dL (7-17)   01/11/19  00:03    


 


Creatinine  0.8 mg/dL (0.7-1.2)   01/11/19  00:03    


 


Estimated GFR  > 60 ml/min  01/11/19  00:03    


 


BUN/Creatinine Ratio  20 %  01/11/19  00:03    


 


Glucose  106 mg/dL ()  H  01/11/19  00:03    


 


Calcium  9.4 mg/dL (8.4-10.2)   01/11/19  00:03    


 


Total Bilirubin  0.70 mg/dL (0.1-1.2)   01/11/19  00:03    


 


AST  24 units/L (5-40)   01/11/19  00:03    


 


ALT  19 units/L (7-56)   01/11/19  00:03    


 


Alkaline Phosphatase  95 units/L ()   01/11/19  00:03    


 


Total Creatine Kinase  284 units/L ()  H  01/11/19  02:36    


 


CK-MB (CK-2)  2.5 ng/mL (0.0-4.0)   01/11/19  02:36    


 


CK-MB (CK-2) Rel Index  0.8  (0-4)   01/11/19  02:36    


 


Troponin T  < 0.010 ng/mL (0.00-0.029)   01/11/19  02:36    


 


Total Protein  7.2 g/dL (6.3-8.2)   01/11/19  00:03    


 


Albumin  4.4 g/dL (3.9-5)   01/11/19  00:03    


 


Albumin/Globulin Ratio  1.6 %  01/11/19  00:03    


 


Urine Color  Colorless  (Yellow)   01/11/19  03:08    


 


Urine Turbidity  Clear  (Clear)   01/11/19  03:08    


 


Urine pH  6.0  (5.0-7.0)   01/11/19  03:08    


 


Ur Specific Gravity  1.002  (1.003-1.030)  L  01/11/19  03:08    


 


Urine Protein  <15 mg/dl mg/dL (Negative)   01/11/19  03:08    


 


Urine Glucose (UA)  Neg mg/dL (Negative)   01/11/19  03:08    


 


Urine Ketones  Neg mg/dL (Negative)   01/11/19  03:08    


 


Urine Blood  Neg  (Negative)   01/11/19  03:08    


 


Urine Nitrite  Neg  (Negative)   01/11/19  03:08    


 


Urine Bilirubin  Neg  (Negative)   01/11/19  03:08    


 


Urine Urobilinogen  < 2.0 mg/dL (<2.0)   01/11/19  03:08    


 


Ur Leukocyte Esterase  Neg  (Negative)   01/11/19  03:08    


 


Urine WBC (Auto)  < 1.0 /HPF (0.0-6.0)   01/11/19  03:08    


 


Urine RBC (Auto)  6.0 /HPF (0.0-6.0)   01/11/19  03:08    


 


U Epithel Cells (Auto)  < 1.0 /HPF (0-13.0)   01/11/19  03:08    














Assessment and Plan


Assessment and plan: 





1. Symptomatic Bradycardia (on Beta blocker)


2. Dizziness (likely due to bradycardia)


3. H/o Elevated heart rate


4. Chest pain r/o ACS


5. Hyperbilirubenia


6. Hypertension


7. GERD





Plan:


Admit to [facility] for symptomatic bradycardia


Consult cardiology for evaluation


Orthostatic BP q8hr and with activity


Continue CE Q6hr x2 more


Repeat EKG with chest pain


Hold all beta blocker


Resume other home meds


Stress test today


Further plan per hospital course 


Pt's condition and plan of care discussed with Dr Huerta


Advance Directives: Yes


VTE prophylaxis?: Mechanical


Plan of care discussed with patient/family: Yes





<BRAN HUERTA E - Last Filed: 01/11/19 07:07>





Medications and Allergies


Active Meds: 


Active Medications





Acetaminophen (Tylenol)  650 mg PO Q4H PRN


   PRN Reason: Pain MILD(1-3)/Fever >100.5/HA


Metoclopramide HCl (Reglan)  10 mg IV Q6H PRN


   PRN Reason: Nausea And Vomiting


Ondansetron HCl (Zofran)  4 mg IV Q8H PRN


   PRN Reason: Nausea And Vomiting


Sodium Chloride (Sodium Chloride Flush Syringe 10 Ml)  10 ml IV BID GLORIA


Sodium Chloride (Sodium Chloride Flush Syringe 10 Ml)  10 ml IV PRN PRN


   PRN Reason: LINE FLUSH











Exam





- Constitutional


Vitals: 


                                        











Temp Pulse Resp BP Pulse Ox


 


 97.8 F   53 L  14   126/67   98 


 


 01/10/19 23:45  01/11/19 05:00  01/11/19 05:00  01/11/19 05:00  01/11/19 05:00














Results





- Labs


CBC & Chem 7: 


                                 01/11/19 00:03





                                 01/11/19 00:03


Labs: 


                             Laboratory Last Values











WBC  5.6 K/mm3 (4.5-11.0)   01/11/19  00:03    


 


RBC  4.40 M/mm3 (3.65-5.03)   01/11/19  00:03    


 


Hgb  12.3 gm/dl (10.1-14.3)   01/11/19  00:03    


 


Hct  36.4 % (30.3-42.9)   01/11/19  00:03    


 


MCV  83 fl (79-97)   01/11/19  00:03    


 


MCH  28 pg (28-32)   01/11/19  00:03    


 


MCHC  34 % (30-34)   01/11/19  00:03    


 


RDW  14.0 % (13.2-15.2)   01/11/19  00:03    


 


Plt Count  288 K/mm3 (140-440)   01/11/19  00:03    


 


Lymph % (Auto)  45.1 % (13.4-35.0)  H  01/11/19  00:03    


 


Mono % (Auto)  9.7 % (0.0-7.3)  H  01/11/19  00:03    


 


Eos % (Auto)  1.0 % (0.0-4.3)   01/11/19  00:03    


 


Baso % (Auto)  0.6 % (0.0-1.8)   01/11/19  00:03    


 


Lymph #  2.5 K/mm3 (1.2-5.4)   01/11/19  00:03    


 


Mono #  0.5 K/mm3 (0.0-0.8)   01/11/19  00:03    


 


Eos #  0.1 K/mm3 (0.0-0.4)   01/11/19  00:03    


 


Baso #  0.0 K/mm3 (0.0-0.1)   01/11/19  00:03    


 


Seg Neutrophils %  43.6 % (40.0-70.0)   01/11/19  00:03    


 


Seg Neutrophils #  2.4 K/mm3 (1.8-7.7)   01/11/19  00:03    


 


Sodium  141 mmol/L (137-145)   01/11/19  00:03    


 


Potassium  4.1 mmol/L (3.6-5.0)   01/11/19  00:03    


 


Chloride  100.9 mmol/L ()   01/11/19  00:03    


 


Carbon Dioxide  27 mmol/L (22-30)   01/11/19  00:03    


 


Anion Gap  17 mmol/L  01/11/19  00:03    


 


BUN  16 mg/dL (7-17)   01/11/19  00:03    


 


Creatinine  0.8 mg/dL (0.7-1.2)   01/11/19  00:03    


 


Estimated GFR  > 60 ml/min  01/11/19  00:03    


 


BUN/Creatinine Ratio  20 %  01/11/19  00:03    


 


Glucose  106 mg/dL ()  H  01/11/19  00:03    


 


Calcium  9.4 mg/dL (8.4-10.2)   01/11/19  00:03    


 


Total Bilirubin  0.70 mg/dL (0.1-1.2)   01/11/19  00:03    


 


AST  24 units/L (5-40)   01/11/19  00:03    


 


ALT  19 units/L (7-56)   01/11/19  00:03    


 


Alkaline Phosphatase  95 units/L ()   01/11/19  00:03    


 


Total Creatine Kinase  252 units/L ()  H  01/11/19  06:07    


 


CK-MB (CK-2)  2.0 ng/mL (0.0-4.0)   01/11/19  06:07    


 


CK-MB (CK-2) Rel Index  0.7  (0-4)   01/11/19  06:07    


 


Troponin T  < 0.010 ng/mL (0.00-0.029)   01/11/19  06:07    


 


Total Protein  7.2 g/dL (6.3-8.2)   01/11/19  00:03    


 


Albumin  4.4 g/dL (3.9-5)   01/11/19  00:03    


 


Albumin/Globulin Ratio  1.6 %  01/11/19  00:03    


 


Urine Color  Colorless  (Yellow)   01/11/19  03:08    


 


Urine Turbidity  Clear  (Clear)   01/11/19  03:08    


 


Urine pH  6.0  (5.0-7.0)   01/11/19  03:08    


 


Ur Specific Gravity  1.002  (1.003-1.030)  L  01/11/19  03:08    


 


Urine Protein  <15 mg/dl mg/dL (Negative)   01/11/19  03:08    


 


Urine Glucose (UA)  Neg mg/dL (Negative)   01/11/19  03:08    


 


Urine Ketones  Neg mg/dL (Negative)   01/11/19  03:08    


 


Urine Blood  Neg  (Negative)   01/11/19  03:08    


 


Urine Nitrite  Neg  (Negative)   01/11/19  03:08    


 


Urine Bilirubin  Neg  (Negative)   01/11/19  03:08    


 


Urine Urobilinogen  < 2.0 mg/dL (<2.0)   01/11/19  03:08    


 


Ur Leukocyte Esterase  Neg  (Negative)   01/11/19  03:08    


 


Urine WBC (Auto)  < 1.0 /HPF (0.0-6.0)   01/11/19  03:08    


 


Urine RBC (Auto)  6.0 /HPF (0.0-6.0)   01/11/19  03:08    


 


U Epithel Cells (Auto)  < 1.0 /HPF (0-13.0)   01/11/19  03:08    














Assessment and Plan


Assessment and plan: 


58-year-old woman with a history of hypertension, hyperlipidemia comes emergency

room complaining of dizziness.  She has been seen her cardiologist who has been 

adjusting her antihypertensive.  She was on Lopressor 25 twice a day, this was 

adjusted to once a day secondary to bradycardia.  Her metoprolol was then switch

to sounds like Bystolic recently.  Patient said that yesterday she became more 

dizzy, her heart rate was in 40s.  She also complained of chest pain in the 

epigastric area.  Agree with plan as discussed above, in addition to obtain a 

stress test, consult cardiology

## 2019-07-23 ENCOUNTER — HOSPITAL ENCOUNTER (EMERGENCY)
Dept: HOSPITAL 5 - ED | Age: 59
Discharge: HOME | End: 2019-07-23
Payer: COMMERCIAL

## 2019-07-23 VITALS — DIASTOLIC BLOOD PRESSURE: 91 MMHG | SYSTOLIC BLOOD PRESSURE: 174 MMHG

## 2019-07-23 DIAGNOSIS — Z91.041: ICD-10-CM

## 2019-07-23 DIAGNOSIS — M54.9: ICD-10-CM

## 2019-07-23 DIAGNOSIS — Z98.51: ICD-10-CM

## 2019-07-23 DIAGNOSIS — Z79.899: ICD-10-CM

## 2019-07-23 DIAGNOSIS — I10: ICD-10-CM

## 2019-07-23 DIAGNOSIS — K21.0: ICD-10-CM

## 2019-07-23 DIAGNOSIS — R07.9: ICD-10-CM

## 2019-07-23 DIAGNOSIS — Z90.710: ICD-10-CM

## 2019-07-23 DIAGNOSIS — E78.00: ICD-10-CM

## 2019-07-23 DIAGNOSIS — F41.9: ICD-10-CM

## 2019-07-23 DIAGNOSIS — M54.2: Primary | ICD-10-CM

## 2019-07-23 PROCEDURE — 72070 X-RAY EXAM THORAC SPINE 2VWS: CPT

## 2019-07-23 PROCEDURE — 99283 EMERGENCY DEPT VISIT LOW MDM: CPT

## 2019-07-23 PROCEDURE — 93010 ELECTROCARDIOGRAM REPORT: CPT

## 2019-07-23 PROCEDURE — 93005 ELECTROCARDIOGRAM TRACING: CPT

## 2019-07-23 PROCEDURE — 72040 X-RAY EXAM NECK SPINE 2-3 VW: CPT

## 2019-07-23 PROCEDURE — 71046 X-RAY EXAM CHEST 2 VIEWS: CPT
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
## 2022-07-24 ENCOUNTER — HOSPITAL ENCOUNTER (EMERGENCY)
Dept: HOSPITAL 5 - ED | Age: 62
Discharge: HOME | End: 2022-07-24
Payer: COMMERCIAL

## 2022-07-24 VITALS — DIASTOLIC BLOOD PRESSURE: 68 MMHG | SYSTOLIC BLOOD PRESSURE: 124 MMHG

## 2022-07-24 DIAGNOSIS — M54.50: Primary | ICD-10-CM

## 2022-07-24 PROCEDURE — 96372 THER/PROPH/DIAG INJ SC/IM: CPT

## 2022-07-24 PROCEDURE — 99282 EMERGENCY DEPT VISIT SF MDM: CPT
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
## 2024-10-08 ENCOUNTER — OFFICE VISIT (OUTPATIENT)
Dept: URBAN - METROPOLITAN AREA CLINIC 109 | Facility: CLINIC | Age: 64
End: 2024-10-08

## 2024-11-05 ENCOUNTER — LAB OUTSIDE AN ENCOUNTER (OUTPATIENT)
Dept: URBAN - METROPOLITAN AREA CLINIC 109 | Facility: CLINIC | Age: 64
End: 2024-11-05

## 2024-11-05 ENCOUNTER — DASHBOARD ENCOUNTERS (OUTPATIENT)
Age: 64
End: 2024-11-05

## 2024-11-05 ENCOUNTER — OFFICE VISIT (OUTPATIENT)
Dept: URBAN - METROPOLITAN AREA CLINIC 109 | Facility: CLINIC | Age: 64
End: 2024-11-05
Payer: COMMERCIAL

## 2024-11-05 VITALS
DIASTOLIC BLOOD PRESSURE: 80 MMHG | SYSTOLIC BLOOD PRESSURE: 144 MMHG | TEMPERATURE: 97.3 F | BODY MASS INDEX: 37.45 KG/M2 | HEIGHT: 66 IN | WEIGHT: 233 LBS | HEART RATE: 59 BPM

## 2024-11-05 DIAGNOSIS — Z12.11 SCREENING FOR COLON CANCER: ICD-10-CM

## 2024-11-05 DIAGNOSIS — R12 HEARTBURN: ICD-10-CM

## 2024-11-05 PROCEDURE — 99202 OFFICE O/P NEW SF 15 MIN: CPT | Performed by: INTERNAL MEDICINE

## 2024-11-05 RX ORDER — PRAVASTATIN SODIUM 80 MG/1
1 TABLET TABLET ORAL ONCE A DAY
Status: ACTIVE | COMMUNITY

## 2024-11-05 RX ORDER — FAMOTIDINE 40 MG/1
1 TABLET AT BEDTIME TABLET, FILM COATED ORAL ONCE A DAY
Status: ACTIVE | COMMUNITY

## 2024-11-05 RX ORDER — LOSARTAN POTASSIUM 25 MG/1
1 TABLET TABLET, FILM COATED ORAL ONCE A DAY
Status: ACTIVE | COMMUNITY

## 2024-11-05 NOTE — HPI-TODAY'S VISIT:
Patient is a 63 y/o F referred by Dr. Alex Wilkins Jr. for a colon cancer screening. A copy of this report will be sent to the referring provider.  Normal bowel habits- formed BM 1-2x/day. Reports lower abdominal pain associated with milk. Also reports hx of hemorrhoids w/ pruritis and BRBPR upon wiping. Denies change in bowel habits, appetite, or weight. No melena.  Also c/o mild heartburn w/ specific triggers like salmon. Currently using famotidine prn. Denies epigastric abdominal pain, dysphagia or other UGI sxs. Fhx of sister with colon polyps. No fhx of colon cancer. Hx of palpitations. On Bisoprolol. Follows up with cardiologist q3 months. No other cardiac, lung, or kidney problems. Not on blood thinners. Last colonoscopy was 10 years ago.

## 2025-04-03 ENCOUNTER — OFFICE VISIT (OUTPATIENT)
Dept: URBAN - METROPOLITAN AREA MEDICAL CENTER 16 | Facility: MEDICAL CENTER | Age: 65
End: 2025-04-03